# Patient Record
Sex: MALE | Race: WHITE | Employment: OTHER | URBAN - METROPOLITAN AREA
[De-identification: names, ages, dates, MRNs, and addresses within clinical notes are randomized per-mention and may not be internally consistent; named-entity substitution may affect disease eponyms.]

---

## 2019-08-19 DIAGNOSIS — R05.3 PERSISTENT COUGH: Primary | ICD-10-CM

## 2019-08-20 NOTE — TELEPHONE ENCOUNTER
RECORDS RECEIVED FROM: External   DATE RECEIVED: 8.28.19   NOTES STATUS DETAILS   OFFICE NOTE from referring provider N/A    OFFICE NOTE from other specialist Internal 8.19.19 urgent Care   DISCHARGE SUMMARY from hospital N/A    DISCHARGE REPORT from the ER N/A    MEDICATION LIST Internal    IMAGING  (NEED IMAGES AND REPORTS)     CT SCAN N/A    CHEST XRAY (CXR) In process 8.19.19   TESTS     PULMONARY FUNCTION TESTING (PFT) In process 3.28.19      Action    Action Taken Requested imaging from [facility]  -Pulled images from [facility]

## 2019-08-28 ENCOUNTER — ANCILLARY PROCEDURE (OUTPATIENT)
Dept: GENERAL RADIOLOGY | Facility: CLINIC | Age: 74
End: 2019-08-28
Attending: STUDENT IN AN ORGANIZED HEALTH CARE EDUCATION/TRAINING PROGRAM
Payer: COMMERCIAL

## 2019-08-28 ENCOUNTER — OFFICE VISIT (OUTPATIENT)
Dept: PULMONOLOGY | Facility: CLINIC | Age: 74
End: 2019-08-28
Attending: INTERNAL MEDICINE
Payer: COMMERCIAL

## 2019-08-28 ENCOUNTER — PRE VISIT (OUTPATIENT)
Dept: PULMONOLOGY | Facility: CLINIC | Age: 74
End: 2019-08-28

## 2019-08-28 VITALS
HEIGHT: 71 IN | BODY MASS INDEX: 36.4 KG/M2 | DIASTOLIC BLOOD PRESSURE: 78 MMHG | SYSTOLIC BLOOD PRESSURE: 121 MMHG | WEIGHT: 260 LBS | OXYGEN SATURATION: 93 % | HEART RATE: 63 BPM | RESPIRATION RATE: 18 BRPM

## 2019-08-28 DIAGNOSIS — R05.3 PERSISTENT COUGH: ICD-10-CM

## 2019-08-28 DIAGNOSIS — R05.3 PERSISTENT COUGH: Primary | ICD-10-CM

## 2019-08-28 DIAGNOSIS — J45.20 MILD INTERMITTENT ASTHMA WITHOUT COMPLICATION: ICD-10-CM

## 2019-08-28 PROCEDURE — G0463 HOSPITAL OUTPT CLINIC VISIT: HCPCS | Mod: ZF

## 2019-08-28 RX ORDER — BUDESONIDE AND FORMOTEROL FUMARATE DIHYDRATE 80; 4.5 UG/1; UG/1
2 AEROSOL RESPIRATORY (INHALATION) 2 TIMES DAILY
Qty: 1 INHALER | Refills: 3 | Status: SHIPPED | OUTPATIENT
Start: 2019-08-28

## 2019-08-28 RX ORDER — LEVOTHYROXINE SODIUM 50 UG/1
50 TABLET ORAL DAILY
Refills: 3 | COMMUNITY
Start: 2019-06-28

## 2019-08-28 RX ORDER — FLUTICASONE PROPIONATE 50 MCG
SPRAY, SUSPENSION (ML) NASAL
Refills: 0 | COMMUNITY
Start: 2019-08-20

## 2019-08-28 RX ORDER — BENZONATATE 100 MG/1
CAPSULE ORAL
Refills: 0 | COMMUNITY
Start: 2019-08-20

## 2019-08-28 ASSESSMENT — PAIN SCALES - GENERAL: PAINLEVEL: NO PAIN (0)

## 2019-08-28 ASSESSMENT — MIFFLIN-ST. JEOR: SCORE: 1931.22

## 2019-08-28 NOTE — PATIENT INSTRUCTIONS
Continue your flonse nasal spray    Start symbicort inhaler twice daily, rinse your mouth after using     Ok to use albuterol as needed    You can use the spacer    Consider follow up with your sleep doctor

## 2019-08-28 NOTE — PROGRESS NOTES
Pulmonary Clinic Note   08/28/2019      PCP: No primary care provider on file.    Reason for visit: cough    Pulmonary HPI:   Samson Can is a 74 year old male w/ h/o a.fib, SSS s/p PPM, HTN, asthma, and allergies who is a new patient here for evaluation of cough.     Patient reports ~2 month of cough, starting in July.  No clear precipitating illness or symptoms.  Was seen in the ED on 8/19, clear CXR, and though to be related to upper airway/sinus congestion and allergies.  Improvement noted with nebs and decadron.  Given flonase and tessalon on discharge.  Since that time he reports there has been some symptomatic improvement but he still is having daily symptoms.  Cough is usually non-productive (occasional clear-yellow sputum), worse in the AM but occurs throughout the day. Sleeps in the recliner because laying flat makes the cough worse.  No orthopnea or PND.  No reflux, well managed with apple cider vinegar and diet modification.  Does have some nasal congestion with post nasal drip that has improved with PM flonase.    Asthma dx as an adult and has been very mild.  No severe exacerbation ever.  Never on daily inhaler.  Has had similar episodes of cough in the past that are self-limited with OTC therapies.  Can go months to years between episodes.  Never has lasted this long.         Patient is a never smoker. Retired .  Possible chlorine gas exposure in the 60s but no lasting effects.  Otherwise no exposures.  Allergic to dust/mold.  , 4 boys.      Patient's outside records were reviewed via Care Everywhere &/or available paper records (sent for scanning).     Review of systems: a complete 12-point ROS conducted, & found to be negative w/ exceptions as noted in the HPI.    Past medical history:  Past Medical History:   Diagnosis Date     Depression      DJD (degenerative joint disease), ankle and foot 11/19/2010     Hypertension      Unspecified asthma(493.90)        Past Surgical  History:   Procedure Laterality Date     ARTHROTOMY SHOULDER, ROTATOR CUFF REPAIR, COMBINED  6/20/2014    Procedure: COMBINED ARTHROTOMY SHOULDER, ROTATOR CUFF REPAIR;  Surgeon: Mandeep Rivas MD;  Location: [facility] OR     C TOTAL KNEE ARTHROPLASTY      bilat     H STATISTIC ADDL BM ASP PERF TC       HC DEEP INCIS SHLDR BONE CORTEX Right 7/16/14    I and D of right shoulder deep abscess / infection     HC PARTIAL REMOVAL, CLAVICLE Right 6/20/14    [place]     HC PARTIAL REMOVAL/REPAIR,ACROMION Right 6/20/14    Neer acromioplasty     HC REPAIR BICEPS LONG TENDON Right 6/20/14       Social history:  Social History     Tobacco Use     Smoking status: Never Smoker     Smokeless tobacco: Never Used   Substance Use Topics     Alcohol use: No     Drug use: No       Family history:  Brother with asthma/allergies  Children with asthma/allergies    Medications:  Current Outpatient Medications   Medication     ACIDOPHILUS LACTOBACILLUS PO     benzonatate (TESSALON) 100 MG capsule     budesonide-formoterol (SYMBICORT) 80-4.5 MCG/ACT Inhaler     Cholecalciferol (VITAMIN D) 2000 UNIT CAPS     fluticasone (FLONASE) 50 MCG/ACT nasal spray     furosemide (LASIX) 80 MG tablet     levothyroxine (SYNTHROID/LEVOTHROID) 50 MCG tablet     losartan (COZAAR) 100 MG tablet     Misc Natural Products (PROSTATE SUPPORT) 300-15 MG TABS     Multiple Vitamins-Minerals (MULTI FOR HIM PO)     phytonadione (K1-1000) 1 MG CAPS     potassium chloride (KLOR-CON) 10 MEQ CR tablet     UNABLE TO FIND     amlodipine-benazepril (LOTREL) 10-20 MG per capsule     Aspirin (ASPIR-81 PO)     citalopram (CELEXA) 20 MG tablet     hydrOXYzine (VISTARIL) 25 MG capsule     oxyCODONE-acetaminophen (PERCOCET) 5-325 MG per tablet     VITAMIN E     No current facility-administered medications for this visit.        Allergies:  Allergies   Allergen Reactions     Dust Mites      No Clinical Screening - See Comments      Patient unsure of med or reaction     Oxycodone  "Other (See Comments)     Stomach upset     Pollen Extract      Vancomycin Itching     Reaction resolved with slow infusion and pre-treatment with Benadryl       Physical examination:  /78 (BP Location: Right arm, Patient Position: Chair, Cuff Size: Adult Large)   Pulse 63   Resp 18   Ht 1.803 m (5' 10.98\")   Wt 117.9 kg (260 lb)   SpO2 93%   BMI 36.28 kg/m      General: NAD  Eyes: Anicteric sclera, PERRL, EOMI  Nose: Nasal mucosa w/o edema or hyperemia; no nasal polyps  Mouth: MMM w/o lesions; no tonsillar enlargement; no oropharyngeal exudate, or erythema  Neck: No masses, no thyromegaly  Lymphatics: No significant cervical or supraclavicular LNs  CV: RRR, no m/c/r  Lungs: Few wheezes in the bases, no crackles  Abd: Soft, NT, ND  Ext: WWP, no LE edema, compression stockings in place. No clubbing  Skin: No rashes, cyanosis, or jaundice  Neuro: Intact mentation w/ normal speech  Psych: Euthymic, normal affect, good eye contact    Labs: reviewed in [facility] & personally interpreted.   No new    Imaging: reviewed in [facility] & personally interpreted. Below are the interpretations from the formal Radiology review.    CXR 8/28/19 - Stable RLL granuloma, otherwise clear    PFT:  8/28/19 - Normal spirometry, lung volumes and diffusion    Impression & recommendations:    Samson was seen today for consult.    Diagnoses and all orders for this visit:    Persistent cough  Mild intermittent asthma without complication  Few months of cough with a h/o intermittent asthma.  Some improvement with flonase suggests a component of post nasal drip.  Given improvement with steroids and nebs in the ED as well as temporary improvement with albuterol it is reasonable to assume some component of cough-variant asthma.  No symptoms of reflux.  With persistent symptoms we will start a low dose ICS/LABA to help with resolution.  Once symptoms improved, can likely discontinue the ICS/LABA and return to albuterol PRN.    -     " Budesonide-formoterol (SYMBICORT) 80-4.5 MCG/ACT Inhaler; Inhale 2 puffs into the lungs 2 times daily  - Albuterol PRN    OTTO  H/o OTTO, not wearing CPAP.  Does nap and have daytime sleepiness.  Sleeping in recliner.  Also has a.fib.    - Follow up with sleep medicine, review new mask options    RTC PRN    Patient was seen & discussed w/ Dr. Simona MD, who is in agreement.    Vinny Agee MD, MPH  Pulmonary & Critical Care, PGY-6  [phone]      I saw and evaluated patient with Fellow.  Case discussed - agree with note.  I reviewed CXR: old granuloma, otherwise normal.  I reviewed PFT: normal.    LUZMARIA CORTÉS M.D.

## 2019-08-28 NOTE — LETTER
Date:August 29, 2019      Patient was self referred, no letter generated. Do not send.        [facility] Health Information

## 2019-08-28 NOTE — LETTER
8/28/2019       RE: Samson Can  [address]  [place] [address]     Dear Colleague,    Thank you for referring your patient, Samson Can, to the [facility] at [facility]. Please see a copy of my visit note below.    Pulmonary Clinic Note   08/28/2019      PCP: No primary care provider on file.    Reason for visit: cough    Pulmonary HPI:   Samson Can is a 74 year old male w/ h/o a.fib, SSS s/p PPM, HTN, asthma, and allergies who is a new patient here for evaluation of cough.     Patient reports ~2 month of cough, starting in July.  No clear precipitating illness or symptoms.  Was seen in the ED on 8/19, clear CXR, and though to be related to upper airway/sinus congestion and allergies.  Improvement noted with nebs and decadron.  Given flonase and tessalon on discharge.  Since that time he reports there has been some symptomatic improvement but he still is having daily symptoms.  Cough is usually non-productive (occasional clear-yellow sputum), worse in the AM but occurs throughout the day. Sleeps in the recliner because laying flat makes the cough worse.  No orthopnea or PND.  No reflux, well managed with apple cider vinegar and diet modification.  Does have some nasal congestion with post nasal drip that has improved with PM flonase.    Asthma dx as an adult and has been very mild.  No severe exacerbation ever.  Never on daily inhaler.  Has had similar episodes of cough in the past that are self-limited with OTC therapies.  Can go months to years between episodes.  Never has lasted this long.         Patient is a never smoker. Retired .  Possible chlorine gas exposure in the 60s but no lasting effects.  Otherwise no exposures.  Allergic to dust/mold.  , 4 boys.      Patient's outside records were reviewed via Care Everywhere &/or available paper records (sent for scanning).     Review of  systems: a complete 12-point ROS conducted, & found to be negative w/ exceptions as noted in the HPI.    Past medical history:  Past Medical History:   Diagnosis Date     Depression      DJD (degenerative joint disease), ankle and foot 11/19/2010     Hypertension      Unspecified asthma(493.90)        Past Surgical History:   Procedure Laterality Date     ARTHROTOMY SHOULDER, ROTATOR CUFF REPAIR, COMBINED  6/20/2014    Procedure: COMBINED ARTHROTOMY SHOULDER, ROTATOR CUFF REPAIR;  Surgeon: Mandeep Rivas MD;  Location: [facility] OR     C TOTAL KNEE ARTHROPLASTY      bilat     H STATISTIC ADDL BM ASP PERF TC       HC DEEP INCIS SHLDR BONE CORTEX Right 7/16/14    I and D of right shoulder deep abscess / infection     HC PARTIAL REMOVAL, CLAVICLE Right 6/20/14    [place]     HC PARTIAL REMOVAL/REPAIR,ACROMION Right 6/20/14    [place] acromioplasty     HC REPAIR BICEPS LONG TENDON Right 6/20/14       Social history:  Social History     Tobacco Use     Smoking status: Never Smoker     Smokeless tobacco: Never Used   Substance Use Topics     Alcohol use: No     Drug use: No       Family history:  Brother with asthma/allergies  Children with asthma/allergies    Medications:  Current Outpatient Medications   Medication     ACIDOPHILUS LACTOBACILLUS PO     benzonatate (TESSALON) 100 MG capsule     budesonide-formoterol (SYMBICORT) 80-4.5 MCG/ACT Inhaler     Cholecalciferol (VITAMIN D) 2000 UNIT CAPS     fluticasone (FLONASE) 50 MCG/ACT nasal spray     furosemide (LASIX) 80 MG tablet     levothyroxine (SYNTHROID/LEVOTHROID) 50 MCG tablet     losartan (COZAAR) 100 MG tablet     Misc Natural Products (PROSTATE SUPPORT) 300-15 MG TABS     Multiple Vitamins-Minerals (MULTI FOR HIM PO)     phytonadione (K1-1000) 1 MG CAPS     potassium chloride (KLOR-CON) 10 MEQ CR tablet     UNABLE TO FIND     amlodipine-benazepril (LOTREL) 10-20 MG per capsule     Aspirin (ASPIR-81 PO)     citalopram (CELEXA) 20 MG tablet     hydrOXYzine (VISTARIL)  "25 MG capsule     oxyCODONE-acetaminophen (PERCOCET) 5-325 MG per tablet     VITAMIN E     No current facility-administered medications for this visit.        Allergies:  Allergies   Allergen Reactions     Dust Mites      No Clinical Screening - See Comments      Patient unsure of med or reaction     Oxycodone Other (See Comments)     Stomach upset     Pollen Extract      Vancomycin Itching     Reaction resolved with slow infusion and pre-treatment with Benadryl       Physical examination:  /78 (BP Location: Right arm, Patient Position: Chair, Cuff Size: Adult Large)   Pulse 63   Resp 18   Ht 1.803 m (5' 10.98\")   Wt 117.9 kg (260 lb)   SpO2 93%   BMI 36.28 kg/m       General: NAD  Eyes: Anicteric sclera, PERRL, EOMI  Nose: Nasal mucosa w/o edema or hyperemia; no nasal polyps  Mouth: MMM w/o lesions; no tonsillar enlargement; no oropharyngeal exudate, or erythema  Neck: No masses, no thyromegaly  Lymphatics: No significant cervical or supraclavicular LNs  CV: RRR, no m/c/r  Lungs: Few wheezes in the bases, no crackles  Abd: Soft, NT, ND  Ext: WWP, no LE edema, compression stockings in place. No clubbing  Skin: No rashes, cyanosis, or jaundice  Neuro: Intact mentation w/ normal speech  Psych: Euthymic, normal affect, good eye contact    Labs: reviewed in [facility] & personally interpreted.   No new    Imaging: reviewed in [facility] & personally interpreted. Below are the interpretations from the formal Radiology review.    CXR 8/28/19 - Stable RLL granuloma, otherwise clear    PFT:  8/28/19 - Normal spirometry, lung volumes and diffusion    Impression & recommendations:    Samson was seen today for consult.    Diagnoses and all orders for this visit:    Persistent cough  Mild intermittent asthma without complication  Few months of cough with a h/o intermittent asthma.  Some improvement with flonase suggests a component of post nasal drip.  Given improvement with steroids and nebs in the ED as well as temporary " improvement with albuterol it is reasonable to assume some component of cough-variant asthma.  No symptoms of reflux.  With persistent symptoms we will start a low dose ICS/LABA to help with resolution.  Once symptoms improved, can likely discontinue the ICS/LABA and return to albuterol PRN.    -     Budesonide-formoterol (SYMBICORT) 80-4.5 MCG/ACT Inhaler; Inhale 2 puffs into the lungs 2 times daily  - Albuterol PRN    OTTO  H/o OTTO, not wearing CPAP.  Does nap and have daytime sleepiness.  Sleeping in recliner.  Also has a.fib.    - Follow up with sleep medicine, review new mask options    RTC PRN    Patient was seen & discussed w/ Dr. Simona MD, who is in agreement.    Vinny Agee MD, MPH  Pulmonary & Critical Care, PGY-6  [phone]      I saw and evaluated patient with Fellow.  Case discussed - agree with note.  I reviewed CXR: old granuloma, otherwise normal.  I reviewed PFT: normal.    LUZMARIA CORTÉS M.D.          Again, thank you for allowing me to participate in the care of your patient.      Sincerely,    Moisés Agee MD

## 2019-08-28 NOTE — NURSING NOTE
Chief Complaint   Patient presents with     Consult     Cough      Suzette Bearden CMA  Completed Fall Risk Screening and updated Health Maintenance .  See screenings  Pt was due for PHQ2. Pt completed. Updated health maintenance. See Screenings.   Updated pt health maintenance on colonscopy. Pt has had it done in 2018

## 2019-08-29 LAB
DLCOUNC-%PRED-PRE: 117 %
DLCOUNC-PRE: 29.32 ML/MIN/MMHG
DLCOUNC-PRED: 24.98 ML/MIN/MMHG
ERV-%PRED-PRE: 30 %
ERV-PRE: 0.12 L
ERV-PRED: 0.4 L
EXPTIME-PRE: 8.05 SEC
FEF2575-%PRED-PRE: 82 %
FEF2575-PRE: 1.8 L/SEC
FEF2575-PRED: 2.19 L/SEC
FEFMAX-%PRED-PRE: 99 %
FEFMAX-PRE: 7.65 L/SEC
FEFMAX-PRED: 7.72 L/SEC
FEV1-%PRED-PRE: 86 %
FEV1-PRE: 2.59 L
FEV1FEV6-PRE: 73 %
FEV1FEV6-PRED: 77 %
FEV1FVC-PRE: 72 %
FEV1FVC-PRED: 75 %
FEV1SVC-PRE: 66 %
FEV1SVC-PRED: 65 %
FIFMAX-PRE: 2.78 L/SEC
FRCPLETH-%PRED-PRE: 76 %
FRCPLETH-PRE: 2.88 L
FRCPLETH-PRED: 3.75 L
FVC-%PRED-PRE: 89 %
FVC-PRE: 3.62 L
FVC-PRED: 4.03 L
IC-%PRED-PRE: 90 %
IC-PRE: 3.83 L
IC-PRED: 4.23 L
RVPLETH-%PRED-PRE: 99 %
RVPLETH-PRE: 2.76 L
RVPLETH-PRED: 2.77 L
TLCPLETH-%PRED-PRE: 94 %
TLCPLETH-PRE: 6.71 L
TLCPLETH-PRED: 7.13 L
VA-%PRED-PRE: 97 %
VA-PRE: 6.16 L
VC-%PRED-PRE: 85 %
VC-PRE: 3.95 L
VC-PRED: 4.62 L

## 2019-11-09 ENCOUNTER — HEALTH MAINTENANCE LETTER (OUTPATIENT)
Age: 74
End: 2019-11-09

## 2020-02-23 ENCOUNTER — HEALTH MAINTENANCE LETTER (OUTPATIENT)
Age: 75
End: 2020-02-23

## 2020-12-06 ENCOUNTER — HEALTH MAINTENANCE LETTER (OUTPATIENT)
Age: 75
End: 2020-12-06

## 2021-04-11 ENCOUNTER — HEALTH MAINTENANCE LETTER (OUTPATIENT)
Age: 76
End: 2021-04-11

## 2021-09-26 ENCOUNTER — HEALTH MAINTENANCE LETTER (OUTPATIENT)
Age: 76
End: 2021-09-26

## 2022-04-19 ENCOUNTER — HOME INFUSION (PRE-WILLOW HOME INFUSION) (OUTPATIENT)
Dept: PHARMACY | Facility: CLINIC | Age: 77
End: 2022-04-19
Payer: COMMERCIAL

## 2022-04-20 NOTE — PROGRESS NOTES
This is a recent snapshot of the patient's [facility] medical record.  For current drug dose and complete information and questions, call [phone] or In Basket pool, fv home infusion ([id number])  CSN Number:  [id number]

## 2022-04-23 ENCOUNTER — HOME INFUSION (PRE-WILLOW HOME INFUSION) (OUTPATIENT)
Dept: PHARMACY | Facility: CLINIC | Age: 77
End: 2022-04-23
Payer: COMMERCIAL

## 2022-04-24 ENCOUNTER — HOME INFUSION (PRE-WILLOW HOME INFUSION) (OUTPATIENT)
Dept: PHARMACY | Facility: CLINIC | Age: 77
End: 2022-04-24
Payer: COMMERCIAL

## 2022-04-26 NOTE — PROGRESS NOTES
This is a recent snapshot of the patient's [facility] medical record.  For current drug dose and complete information and questions, call [phone] or In Basket pool, fv home infusion ([id number])  CSN Number:  [id number]

## 2022-04-26 NOTE — PROGRESS NOTES
This is a recent snapshot of the patient's [facility] medical record.  For current drug dose and complete information and questions, call [phone] or In Basket pool, fv home infusion ([id number])  CSN Number:  [id number]

## 2022-05-03 ENCOUNTER — HOME INFUSION (PRE-WILLOW HOME INFUSION) (OUTPATIENT)
Dept: PHARMACY | Facility: CLINIC | Age: 77
End: 2022-05-03
Payer: COMMERCIAL

## 2022-05-04 ENCOUNTER — HOME INFUSION (PRE-WILLOW HOME INFUSION) (OUTPATIENT)
Dept: PHARMACY | Facility: CLINIC | Age: 77
End: 2022-05-04
Payer: COMMERCIAL

## 2022-05-06 NOTE — PROGRESS NOTES
This is a recent snapshot of the patient's [facility] medical record.  For current drug dose and complete information and questions, call [phone] or In Basket pool, fv home infusion ([id number])  CSN Number:  [id number]

## 2022-05-06 NOTE — PROGRESS NOTES
This is a recent snapshot of the patient's [facility] medical record.  For current drug dose and complete information and questions, call [phone] or In Basket pool, fv home infusion ([id number])  CSN Number:  [id number]

## 2022-05-07 ENCOUNTER — HEALTH MAINTENANCE LETTER (OUTPATIENT)
Age: 77
End: 2022-05-07

## 2022-05-09 PROCEDURE — 85025 COMPLETE CBC W/AUTO DIFF WBC: CPT | Performed by: INTERNAL MEDICINE

## 2022-05-09 PROCEDURE — 82565 ASSAY OF CREATININE: CPT | Performed by: INTERNAL MEDICINE

## 2022-05-09 PROCEDURE — 82550 ASSAY OF CK (CPK): CPT | Performed by: INTERNAL MEDICINE

## 2022-05-09 PROCEDURE — 86140 C-REACTIVE PROTEIN: CPT | Performed by: INTERNAL MEDICINE

## 2022-05-09 PROCEDURE — 84450 TRANSFERASE (AST) (SGOT): CPT | Performed by: INTERNAL MEDICINE

## 2022-05-10 ENCOUNTER — HOME INFUSION (PRE-WILLOW HOME INFUSION) (OUTPATIENT)
Dept: PHARMACY | Facility: CLINIC | Age: 77
End: 2022-05-10

## 2022-05-11 ENCOUNTER — HOME INFUSION (PRE-WILLOW HOME INFUSION) (OUTPATIENT)
Dept: PHARMACY | Facility: CLINIC | Age: 77
End: 2022-05-11

## 2022-05-13 ENCOUNTER — HOME INFUSION (PRE-WILLOW HOME INFUSION) (OUTPATIENT)
Dept: PHARMACY | Facility: CLINIC | Age: 77
End: 2022-05-13
Payer: COMMERCIAL

## 2022-05-16 PROCEDURE — 84450 TRANSFERASE (AST) (SGOT): CPT | Performed by: INTERNAL MEDICINE

## 2022-05-16 PROCEDURE — 86140 C-REACTIVE PROTEIN: CPT | Performed by: INTERNAL MEDICINE

## 2022-05-16 PROCEDURE — 82565 ASSAY OF CREATININE: CPT | Performed by: INTERNAL MEDICINE

## 2022-05-16 PROCEDURE — 82550 ASSAY OF CK (CPK): CPT | Performed by: INTERNAL MEDICINE

## 2022-05-16 PROCEDURE — 85025 COMPLETE CBC W/AUTO DIFF WBC: CPT | Performed by: INTERNAL MEDICINE

## 2022-05-23 ENCOUNTER — LAB (OUTPATIENT)
Dept: LAB | Facility: CLINIC | Age: 77
End: 2022-05-23

## 2022-05-23 PROCEDURE — 82550 ASSAY OF CK (CPK): CPT | Performed by: INTERNAL MEDICINE

## 2022-05-23 PROCEDURE — 82565 ASSAY OF CREATININE: CPT | Performed by: INTERNAL MEDICINE

## 2022-05-23 PROCEDURE — 84450 TRANSFERASE (AST) (SGOT): CPT | Performed by: INTERNAL MEDICINE

## 2022-05-23 PROCEDURE — 85004 AUTOMATED DIFF WBC COUNT: CPT | Performed by: INTERNAL MEDICINE

## 2022-05-23 PROCEDURE — 86140 C-REACTIVE PROTEIN: CPT | Performed by: INTERNAL MEDICINE

## 2022-05-24 ENCOUNTER — HOME INFUSION (PRE-WILLOW HOME INFUSION) (OUTPATIENT)
Dept: PHARMACY | Facility: CLINIC | Age: 77
End: 2022-05-24
Payer: COMMERCIAL

## 2022-05-25 NOTE — PROGRESS NOTES
This is a recent snapshot of the patient's [facility] medical record.  For current drug dose and complete information and questions, call [phone] or In Basket pool, fv home infusion ([id number])  CSN Number:  [id number]

## 2022-05-27 ENCOUNTER — HOME INFUSION (PRE-WILLOW HOME INFUSION) (OUTPATIENT)
Dept: PHARMACY | Facility: CLINIC | Age: 77
End: 2022-05-27
Payer: COMMERCIAL

## 2022-05-31 ENCOUNTER — HOME INFUSION (PRE-WILLOW HOME INFUSION) (OUTPATIENT)
Dept: PHARMACY | Facility: CLINIC | Age: 77
End: 2022-05-31

## 2022-05-31 PROCEDURE — 82565 ASSAY OF CREATININE: CPT | Performed by: INTERNAL MEDICINE

## 2022-05-31 PROCEDURE — 86140 C-REACTIVE PROTEIN: CPT | Performed by: INTERNAL MEDICINE

## 2022-05-31 PROCEDURE — 82550 ASSAY OF CK (CPK): CPT | Performed by: INTERNAL MEDICINE

## 2022-05-31 PROCEDURE — 85025 COMPLETE CBC W/AUTO DIFF WBC: CPT | Performed by: INTERNAL MEDICINE

## 2022-05-31 PROCEDURE — 84450 TRANSFERASE (AST) (SGOT): CPT | Performed by: INTERNAL MEDICINE

## 2022-06-01 NOTE — PROGRESS NOTES
This is a recent snapshot of the patient's [facility] medical record.  For current drug dose and complete information and questions, call [phone] or In Basket pool, fv home infusion ([phone])  CSN Number:  [id number]

## 2022-06-01 NOTE — PROGRESS NOTES
This is a recent snapshot of the patient's [facility] medical record.  For current drug dose and complete information and questions, call [phone] or In Basket pool, fv home infusion ([id number])  CSN Number:  [id number]

## 2022-06-02 ENCOUNTER — HOME INFUSION (PRE-WILLOW HOME INFUSION) (OUTPATIENT)
Dept: PHARMACY | Facility: CLINIC | Age: 77
End: 2022-06-02
Payer: COMMERCIAL

## 2022-06-03 ENCOUNTER — HOME INFUSION (PRE-WILLOW HOME INFUSION) (OUTPATIENT)
Dept: PHARMACY | Facility: CLINIC | Age: 77
End: 2022-06-03

## 2022-06-07 NOTE — PROGRESS NOTES
This is a recent snapshot of the patient's [facility] medical record.  For current drug dose and complete information and questions, call [phone] or In Basket pool, fv home infusion ([id number])  CSN Number:  [id number]

## 2022-06-09 ENCOUNTER — HOME INFUSION (PRE-WILLOW HOME INFUSION) (OUTPATIENT)
Dept: PHARMACY | Facility: CLINIC | Age: 77
End: 2022-06-09
Payer: COMMERCIAL

## 2022-06-10 NOTE — PROGRESS NOTES
This is a recent snapshot of the patient's [facility] medical record.  For current drug dose and complete information and questions, call [phone] or In Basket pool, fv home infusion ([id number])  CSN Number:  [id number]

## 2022-06-20 NOTE — PROGRESS NOTES
This is a recent snapshot of the patient's [facility] medical record.  For current drug dose and complete information and questions, call [phone] or In Basket pool, fv home infusion ([id number])  CSN Number:  [id number]

## 2022-08-29 NOTE — PROGRESS NOTES
This is a recent snapshot of the patient's [facility] medical record.  For current drug dose and complete information and questions, call [phone] or In Basket pool, fv home infusion ([id number])  CSN Number:  [id number]

## 2023-04-23 ENCOUNTER — HEALTH MAINTENANCE LETTER (OUTPATIENT)
Age: 78
End: 2023-04-23

## 2023-06-02 ENCOUNTER — HEALTH MAINTENANCE LETTER (OUTPATIENT)
Age: 78
End: 2023-06-02

## 2023-06-28 NOTE — PROGRESS NOTES
This is a recent snapshot of the patient's [facility] medical record.  For current drug dose and complete information and questions, call [phone] or In Basket pool, fv home infusion ([id number])  CSN Number:  [id number]

## 2024-02-23 NOTE — PROGRESS NOTES
This is a recent snapshot of the patient's [facility] medical record.  For current drug dose and complete information and questions, call [phone] or In Basket pool, fv home infusion ([id number])  CSN Number:  [id number]

## 2024-06-29 ENCOUNTER — HEALTH MAINTENANCE LETTER (OUTPATIENT)
Age: 79
End: 2024-06-29